# Patient Record
Sex: FEMALE | Race: BLACK OR AFRICAN AMERICAN | NOT HISPANIC OR LATINO | URBAN - METROPOLITAN AREA
[De-identification: names, ages, dates, MRNs, and addresses within clinical notes are randomized per-mention and may not be internally consistent; named-entity substitution may affect disease eponyms.]

---

## 2022-08-29 PROBLEM — R92.30 DENSE BREAST TISSUE: Status: ACTIVE | Noted: 2022-08-29

## 2022-08-29 PROBLEM — N92.6 LATE MENSES: Status: ACTIVE | Noted: 2022-08-29

## 2022-08-29 PROBLEM — N89.8 VAGINAL DISCHARGE: Status: ACTIVE | Noted: 2022-08-29

## 2023-03-16 PROBLEM — R30.0 DYSURIA: Status: ACTIVE | Noted: 2023-03-16

## 2023-03-16 PROBLEM — N91.2 AMENORRHEA: Status: ACTIVE | Noted: 2023-03-16

## 2023-03-16 PROBLEM — N89.8 VAGINAL DISCHARGE: Status: RESOLVED | Noted: 2022-08-29 | Resolved: 2023-03-16

## 2023-03-16 PROBLEM — R92.30 DENSE BREAST TISSUE: Status: RESOLVED | Noted: 2022-08-29 | Resolved: 2023-03-16

## 2023-03-23 PROBLEM — O09.32 INITIAL OBSTETRIC VISIT IN SECOND TRIMESTER: Status: ACTIVE | Noted: 2023-03-23

## 2023-03-23 PROBLEM — R30.0 DYSURIA: Status: RESOLVED | Noted: 2023-03-16 | Resolved: 2023-03-23

## 2023-03-23 PROBLEM — N91.2 AMENORRHEA: Status: RESOLVED | Noted: 2023-03-16 | Resolved: 2023-03-23

## 2023-06-13 ENCOUNTER — PATIENT MESSAGE (OUTPATIENT)
Dept: RESEARCH | Facility: HOSPITAL | Age: 23
End: 2023-06-13

## 2023-06-30 ENCOUNTER — PATIENT MESSAGE (OUTPATIENT)
Dept: FAMILY MEDICINE | Facility: CLINIC | Age: 23
End: 2023-06-30
Payer: MEDICAID

## 2023-07-11 ENCOUNTER — PATIENT MESSAGE (OUTPATIENT)
Dept: RESEARCH | Facility: HOSPITAL | Age: 23
End: 2023-07-11
Payer: MEDICAID

## 2023-09-05 ENCOUNTER — PATIENT MESSAGE (OUTPATIENT)
Dept: CARDIOLOGY | Facility: CLINIC | Age: 23
End: 2023-09-05
Payer: MEDICAID

## 2023-10-04 PROBLEM — Z09 POSTOP CHECK: Status: ACTIVE | Noted: 2023-10-04

## 2023-10-04 PROBLEM — N92.6 LATE MENSES: Status: RESOLVED | Noted: 2022-08-29 | Resolved: 2023-10-04

## 2023-10-04 PROBLEM — O09.32 INITIAL OBSTETRIC VISIT IN SECOND TRIMESTER: Status: RESOLVED | Noted: 2023-03-23 | Resolved: 2023-10-04

## 2023-10-05 ENCOUNTER — CLINICAL SUPPORT (OUTPATIENT)
Dept: REHABILITATION | Facility: HOSPITAL | Age: 23
End: 2023-10-05
Attending: STUDENT IN AN ORGANIZED HEALTH CARE EDUCATION/TRAINING PROGRAM
Payer: MEDICAID

## 2023-10-05 DIAGNOSIS — M62.89 PELVIC FLOOR DYSFUNCTION: ICD-10-CM

## 2023-10-05 DIAGNOSIS — M54.50 LUMBAR PAIN: ICD-10-CM

## 2023-10-05 DIAGNOSIS — R52 PAIN: ICD-10-CM

## 2023-10-05 PROCEDURE — 97110 THERAPEUTIC EXERCISES: CPT | Mod: PN

## 2023-10-05 PROCEDURE — 97161 PT EVAL LOW COMPLEX 20 MIN: CPT | Mod: PN

## 2023-10-05 NOTE — PLAN OF CARE
[facility]   Physical Therapy Initial Evaluation     Date: 10/5/2023   Name: Haresh Hood  Clinic Number: [id number]    Therapy Diagnosis:   Encounter Diagnoses   Name Primary?    Pain     Lumbar pain     Pelvic floor dysfunction      Physician: Shannan Zavala MD    Physician Orders: PT Eval and Treat   Medical Diagnosis from Referral: Pain [R52]  Evaluation Date: 10/5/2023  Authorization Period Expiration: 2023  Plan of Care Expiration: 2023  Progress Note Due: 2023  Visit # / Visits authorized:  eval   FOTO: 1/3 (last performed 10/5/2023)     Precautions: Standard, hx of     Time In: 3:30  Time Out: 4:00  Total Appointment Time (timed & untimed codes): 30 minutes      SUBJECTIVE     Date of onset: about 2 months ago     History of current condition - Haresh reports: pain to left flank region and tailbone towards end of pregnancy; greater with prolong sitting and standing. Reports pain to area with sit to stand transfers and bending over to dress her baby. History of  2 weeks ago.      Falls: none    Imaging: none    Prior Therapy:   [x] N/A    [] Yes:   Social History: lives with their spouse and new born   Occupation: na   Prior Level of Function: independent  Current Level of Function: independent with increased pain     Pain:  Current 6/10, worst 7/10  Location: left lumbar and tailbone  Description: Aching  Aggravating Factors: Bending, Getting out of bed/chair, and prolong sitting and standing   Easing Factors: rest    Patients goals: reduce pain and return to PLOF      Medical History:   Past Medical History:   Diagnosis Date    HPV in female        Surgical History:   Haresh Hood  has a past surgical history that includes Breast biopsy.    Medications:   Haresh has a current medication list which includes the following prescription(s): aspirin, ibuprofen, multivit 38/folate no.6/philip, and oxycodone.    Allergies:   Review of  patient's allergies indicates:  No Known Allergies     OBJECTIVE     POSTURE: increased lordosis     SFMA:  Top Tiers Right  (Spine) Left Notes    Multi-segmental   Flexion DP proximal knee     Multi-segmental Extension DN 50% limited     Multi-segmental Rotation  FN DN 50% limited    Single Leg Stance  (10 sec) FN FN    FN = Functional Normal   FP = Functional Painful  DN = Dysfunctional Normal   DP = Dysfuncional Painful     STRENGTH:   Lower Extremity  Strength RIGHT   LEFT   Hip Flexion  []1  []2  []3  [x]4*  []5      []+ []- []1  []2  []3  [x]4*  []5      [x]+ []-   Knee Flexion []1  []2  []3  [x]4  []5      []+ []- []1  []2  []3  [x]4  []5      []+ []-   Knee Extension []1  []2  []3  [x]4  []5      []+ []- []1  []2  []3  [x]4  []5      []+ []-   Ankle Dorsiflexion []1  []2  []3  [x]4  []5      []+ []- []1  []2  []3  [x]4  []5      []+ []-     RANGE OF MOTION: (*) pain and/or dysfunction   BLE within normal limits, except limited left hip internal rotation     SPECIAL TESTS:   SI Cluster test:     (-) Distraction     (-) Thigh thrust    (NT) Gaenslen's     (-) Compression     (NT) Sacral Thrust     SENSATION:  [x] Intact to Light Touch     [] Impaired:    PALPATION: no tenderness to palpation noted     JOINT MOBILITY: NT    Intake Outcome Measure for FOTO lumbar Survey    Therapist reviewed FOTO scores for Haresh Hood on 10/5/2023.   FOTO documents entered into [employer] - see Media section.    Intake Score: 65%       TREATMENT     Total Treatment time (time-based codes) separate from Evaluation: 10 minutes      Haresh received the treatments listed below:      manual therapy techniques: were applied to - for (-) minutes , including:     therapeutic exercises to develop strength, endurance, ROM, and flexibility for (-) minutesincluding:    neuromuscular re-education activities to improve: Balance, Coordination, Proprioception, Posture, and stability for (10) minutes . The following activities were  included:   Supine transverse abdominis training    Supine posterior pelvic tilt   Glut set with TA and PPT    therapeutic activities to improve functional performance for (-) minutes including:    gait training to improve functional mobility and safety for (-) minutes, including:    PATIENT EDUCATION AND HOME EXERCISES     Education provided:   - HEP provided   - PT role in POC     Written Home Exercises Provided: yes. Exercises were reviewed and Haresh was able to demonstrate them prior to the end of the session.  Haresh demonstrated good  understanding of the education provided. See EMR under Patient Instructions for exercises provided during therapy sessions.    ASSESSMENT     Haresh is a 23 y.o. female referred to outpatient Physical Therapy with a medical diagnosis of left lumbar pain. Pt presents with impairments in the following categories: IMPAIRMENTS: ROM, strength, muscle length, posture, core strength and stability, functional movement patterns, and pelvic floor dysfunction.     Patient prognosis is Good.   Patient will benefit from skilled outpatient Physical Therapy to address the deficits stated above and in the chart below, provide patient /family education, and to maximize patientt's level of independence.     Plan of care discussed with patient: Yes  Patient's spiritual, cultural and educational needs considered and patient is agreeable to the plan of care and goals as stated below:     Anticipated Barriers for therapy: none    Medical Necessity is demonstrated by the following  History  Co-morbidities and personal factors that may impact the plan of care [x] LOW: no personal factors / co-morbidities  [] MODERATE: 1-2 personal factors / co-morbidities  [] HIGH: 3+ personal factors / co-morbidities    Moderate / High Support Documentation:   Past Medical History:   Diagnosis Date    HPV in female         Examination  Body Structures and Functions, activity limitations and participation restrictions  that may impact the plan of care [x] LOW: addressing 1-2 elements  [] MODERATE: 3+ elements  [] HIGH: 4+ elements (please support below)    Moderate / High Support Documentation: na     Clinical Presentation [x] LOW: stable  [] MODERATE: Evolving  [] HIGH: Unstable     Decision Making/ Complexity Score: low       Goals:   Short Term Goals:  3 weeks  HEP: Patient will demonstrate 50% independence with HEP   Pain: Pt will demonstrate improved pain less than or equal to 3/10 at worse  Function: Patient will increase functional score to equal or greater than to 70 out of 100 on FOTO.  Mobility: Patient will improve trunk rotation by 15%.   Strength: Patient will improve impaired strength to greater or equal to 4/5.     Long Term Goals:  4 weeks  HEP: Pt will be independent with advanced HEP.  Pain: Pt will demonstrate improved pain less than or equal to 1/10 at worse in order to progress toward maximal functional ability and improve QOL.  Function: Patient will  increase functional score to equal or greater than to 75 out of 100 on FOTO to improve functional independence and quality of life.   Mobility: Patient will demonstrate no pain with trunk ROM to return to prior level of function.   Strength: Patient will improve impaired strength to greater or equal to 4+/5 in order to return to PLOF   Pt will be able to bend forward with no increase of pain in order to care for baby.     PLAN   Plan of care Certification: 10/5/2023 to 11/17/2023.    Outpatient Physical Therapy 2 times weekly for 6 weeks to include the following interventions: Cervical/Lumbar Traction, Electrical Stimulation  , Gait Training, Manual Therapy, Moist Heat/ Ice, Neuromuscular Re-ed, Patient Education, Self Care, Therapeutic Activities, Therapeutic Exercise, and FDN .     Dorene Briggs, PT      I CERTIFY THE NEED FOR THESE SERVICES FURNISHED UNDER THIS PLAN OF TREATMENT AND WHILE UNDER MY CARE   Physician's comments:     Physician's Signature:  ___________________________________________________

## 2023-10-09 PROBLEM — M62.89 PELVIC FLOOR DYSFUNCTION: Status: ACTIVE | Noted: 2023-10-09

## 2023-10-09 PROBLEM — M54.50 LUMBAR PAIN: Status: ACTIVE | Noted: 2023-10-09

## 2023-10-12 NOTE — PROGRESS NOTES
[facility]   Pelvic Health Physical Therapy Re-Evaluation     Date: 10/13/2023   Name: Haresh Hood  Clinic Number: [id number]    Therapy Diagnosis:   Encounter Diagnoses   Name Primary?    Lumbar pain Yes    Pelvic floor dysfunction      Physician: Shannan Zavala MD    Physician Orders: PT Eval and Treat   Medical Diagnosis from Referral: Pain [R52]  Evaluation Date: 10/13/2023  Authorization Period Expiration: 2024  Plan of Care Expiration: 2023  Progress Note Due: 2023  Visit # / Visits authorized:  + eval  FOTO: Issued Visit #: 1 /3 (pelvic floor and lumbar)    Precautions: Standard, 3 weeks postpartum     Time In: 8:16  Time Out: 9:03  Total Appointment Time (timed & untimed codes): 47 minutes    SUBJECTIVE     Date of onset: during third trimester of pregnancy     History of current condition - Haresh reports: pain in low back and glute throughout 3rd trimester as well as beginning of post partum. She describes it as a deep pain on the left side.  delivery  and she is currently 3 weeks postpartum. She states that her pain has gotten worse since delivery. She has pain with transfers such as sit to sidelying and sit to/from stand after prolonged sitting or standing.     OB/GYN History: , caesarean, and painful periods  Sexually active? Yes  Pain with vaginal exams, intercourse or tampon use? Yes. Patient reports pain during start of intercourse and increased tightness/pelvic pain before orgasm.     Bladder/Bowel History: trouble initiating urine stream, trouble feeling bladder urge/fullness, trouble emptying bladder completely, and constipation/straining for movement  Frequency of urination:   Daytime: 6          Nighttime: 2  Difficulty initiating urine stream: Yes  Urine stream: strong  Complete emptying: No  Bladder leakage: No  Frequency of incidents: none  Amount leaked (urine):  none  Urinary Urgency: No, Able to delay the  urge for at least 5 minute(s). Only getting urge when completely full   Frequency of bowel movements:  once a week if chance. Constipation as a kid. Constipation again since giving birth  Difficulty initiating BM: Yes  Quality/Shape of BM: [place] Stool Chart Type 2  Does Patient Feel Empty after BM? No  Fiber Supplements or Laxative Use? Yes, stool softeners kind of helping   Colon leakage: No  Frequency of incidents: none   Amount leaked (bowels):  none  Form of protection: pad, postpartum  Number of pads required in 24 hours: 3    Pain:  Location: low back/glute  Current 0/10, worst 10/10  Description: Sharp will last about 10 minutes  Aggravating Factors/Activities that cause symptoms: Prolonged standing, Prolonged sitting, Bending, Squatting, and Lifting sitting in the bath tub or getting into bed  Easing Factors: nothing     Imaging: none    Prior Therapy: no  Social History:  lives with their family  Current exercise: walking 4 times a week  Occupation: none  Prior Level of Function: WFL  Current Level of Function: WFL; limited to certain transfers due to pain     Types of fluid intake: water and coffee 3 bottles of water, coffee 4x/week   Diet: regular   Abuse/Neglect: No     Patients goals: decrease low back pain and improve healthy lifestyle     Medical History: 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     Surgical History: Haresh Hood  has a past surgical history that includes Breast biopsy.    Medications: Haresh has a current medication list which includes the following prescription(s): aspirin, ibuprofen, multivit 38/folate no.6/philip, and oxycodone.    Allergies:   Review of patient's allergies indicates:  No Known Allergies     OBJECTIVE     Deferring internal assessment until 6 weeks post partum.     ORTHO SCREEN  Posture in sitting: slouched   Posture in standing: increased lordosis  Pelvic alignment: no sign of deviations noted in supine   SI Joint Palpation: Tenderness below  left SI joint palpation.  Single limb balance: RLE- L pelvic drop    STRENGTH:   Lower Extremity  Strength RIGHT    LEFT   Hip Flexion  []1  []2  []3  [x]4*  []5      []+ []- []1  []2  []3  [x]4*  []5      [x]+ []-   Knee Flexion []1  []2  []3  [x]4  []5      []+ []- []1  []2  []3  [x]4  []5      []+ []-   Knee Extension []1  []2  []3  [x]4  []5      []+ []- []1  []2  []3  [x]4  []5      []+ []-   Ankle Dorsiflexion []1  []2  []3  [x]4  []5      []+ []- []1  []2  []3  [x]4  []5      []+ []-        ABDOMINAL WALL ASSESSMENT  Palpation: boggy and hypotonic   Abdominal strength: Transverse abdominus: fair  Scarring: (assess  scar next visit)  Pelvic Floor Muscle and Transverse Abdominus Synergy: absent  Diastasis: present    Curl-up test for Inter-rectus distance (IRD)    With transverse abdominus contraction: greater than 2 fingers width at the umbilicus, greater than 2 fingers width 2 inches above umbilicus, 2 fingers width 2 inches below umbilicus   Increased depth with curl up noted.         BREATHING MECHANICS ASSESSMENT   Thorax Assessment During Quiet Respiration: Decreased excursion of abdominal wall  and Decreased excursion bilaterally of lateral ribs   Thorax Assessment During Deep Respiration: Decreased excursion of abdominal wall     Limitation/Restriction for FOTO Pelvic Floor Survey    Therapist reviewed FOTO scores for Haresh Hood on 10/13/2023.   FOTO documents entered into [employer] - see Media section.    Limitation Score:          TREATMENT     Total Treatment time (time-based codes) separate from Evaluation: 18 minutes       Therapeutic Activity to improve dynamic functional performance for 08 minutes including:    Reviewed HEP: TA contractions, Pelvic tilts, Diaphragmatic breathing  Education and demonstration on double voiding techniques. Handout given.   Education on TA contractions and diaphragmatic breathing       PATIENT EDUCATION AND HOME EXERCISES     Education provided:   general  anatomy/physiology of urinary/ bowel  system, benefits of treatment, risks of treatment, and alternative methods of treatment were discussed with the patient. Additionally, bladder irritants, anatomy/physiology of pelvic floor, posture/body mechanices, bladder retraining, diaphragmatic breathing, double voiding techniques, isometric abdominal exercises, kegels, proper bearing down techniques, and fluid intake/dietary modifications were reviewed.     Written Home Exercises provided: yes.  Exercises were reviewed and Haresh was able to demonstrate them prior to the end of the session. Haresh demonstrated good  understanding of the education provided. See EMR under Patient Instructions for exercises provided during therapy sessions.    ASSESSMENT     Haresh is a 23 y.o. female referred to outpatient Physical Therapy with a medical diagnosis of Pain. Pt presents with altered posture, poor knowledge of body mechanics and posture, adhered abdominal scar, poor trunk stability, pelvic floor tenderness, decreased pelvic muscle strength, decreased endurance of the pelvic muscles, increased tension of the pelvic muscles, poor fluid intake, and dysfunctional voiding.        Patient prognosis is Good.   Patient will benefit from skilled outpatient Physical Therapy to address the deficits stated above and in the chart below, provide patient/family education, and to maximize patient's level of independence.     Plan of care discussed with patient: Yes  Patient's spiritual, cultural and educational needs considered and patient is agreeable to the plan of care and goals as stated below:     Anticipated Barriers for therapy: none  Goals:   Short Term Goals:  3 weeks  HEP: Patient will demonstrate 50% independence with HEP   Pain: Pt will demonstrate improved pain less than or equal to 3/10 at worse  Function: Patient will increase functional score to equal or greater than to 70 out of 100 on FOTO.  Mobility: Patient will improve  trunk rotation by 15%.   Strength: Patient will improve impaired strength to greater or equal to 4/5.   Pelvic Floor: Patient will demonstrate excellent knowledge and adherence to HEP to facilitate optimal recovery.  Pelvic Floor: Patient will demonstrate proper PFM contraction, relaxation, and lengthening coordinated with TA and breath for improved muscle coordination needed for functional activity.     Long Term Goals:  4 weeks  HEP: Pt will be independent with advanced HEP.  Pain: Pt will demonstrate improved pain less than or equal to 1/10 at worse in order to progress toward maximal functional ability and improve QOL.  Function: Patient will  increase functional score to equal or greater than to 75 out of 100 on FOTO to improve functional independence and quality of life.   Mobility: Patient will demonstrate no pain with trunk ROM to return to prior level of function.   Strength: Patient will improve impaired strength to greater or equal to 4+/5 in order to return to PLOF   Pt will be able to bend forward with no increase of pain in order to care for baby.   Pelvic Floor: Patient to report a decrease in pain to no more than 2/10 at it's worst with intercourse.  Pelvic Floor: Patient to report improved restorative sleeping, waking up no more than 1x/night due to urge to urinate.  Pelvic Floor: Patient to demonstrate proper positioning on commode with breathing techniques to decrease strain with BM to enable pt to feel empty after BM.  Pelvic Floor: Patient to demonstrate independence with performing bowel massage to help with gut motility.     PLAN   Plan of care Certification: 10/5/2023 to 11/17/2023.     Outpatient Physical Therapy 2 times weekly for 6 weeks to include the following interventions: Cervical/Lumbar Traction, Electrical Stimulation  , Gait Training, Manual Therapy, Moist Heat/ Ice, Neuromuscular Re-ed, Patient Education, Self Care, Therapeutic Activities, Therapeutic Exercise, and FDN .      Steve Tinsley PT      I CERTIFY THE NEED FOR THESE SERVICES FURNISHED UNDER THIS PLAN OF TREATMENT AND WHILE UNDER MY CARE   Physician's comments:     Physician's Signature: ___________________________________________________

## 2023-10-13 ENCOUNTER — CLINICAL SUPPORT (OUTPATIENT)
Dept: REHABILITATION | Facility: HOSPITAL | Age: 23
End: 2023-10-13
Attending: STUDENT IN AN ORGANIZED HEALTH CARE EDUCATION/TRAINING PROGRAM
Payer: MEDICAID

## 2023-10-13 DIAGNOSIS — M54.50 LUMBAR PAIN: Primary | ICD-10-CM

## 2023-10-13 DIAGNOSIS — M62.89 PELVIC FLOOR DYSFUNCTION: ICD-10-CM

## 2023-10-13 PROCEDURE — 97164 PT RE-EVAL EST PLAN CARE: CPT | Mod: PN

## 2023-10-13 PROCEDURE — 97530 THERAPEUTIC ACTIVITIES: CPT | Mod: PN

## 2023-10-16 ENCOUNTER — CLINICAL SUPPORT (OUTPATIENT)
Dept: REHABILITATION | Facility: HOSPITAL | Age: 23
End: 2023-10-16
Payer: MEDICAID

## 2023-10-16 DIAGNOSIS — M62.89 PELVIC FLOOR DYSFUNCTION: ICD-10-CM

## 2023-10-16 DIAGNOSIS — M54.50 LUMBAR PAIN: Primary | ICD-10-CM

## 2023-10-16 PROCEDURE — 97110 THERAPEUTIC EXERCISES: CPT | Mod: PN

## 2023-10-16 NOTE — PROGRESS NOTES
Pelvic Health Physical Therapy   Treatment Note     Name: Haresh Hood  [facility] Number: [id number]    Therapy Diagnosis:   Encounter Diagnoses   Name Primary?    Lumbar pain Yes    Pelvic floor dysfunction      Physician: Shannan Zavala MD    Visit Date: 10/16/2023    Physician Orders: PT Eval and Treat   Medical Diagnosis from Referral: Pain [R52]  Evaluation Date: 10/13/2023  Authorization Period Expiration: 03/28/2024  Plan of Care Expiration: 11/24/2023  Progress Note Due: 11/13/2023  Visit # / Visits authorized: 2/ 20 + eval  FOTO: Issued Visit #: 1 /3 (pelvic floor and lumbar)     Precautions: Standard, 3 weeks postpartum      Time In: 1:30  Time Out: 2:24  Total Appointment Time (timed & untimed codes): 54 minutes    Subjective     Pt reports: increased low back pain especially at night. Rates her pain a 9/10 at night.   She was compliant with home exercise program.  Response to previous treatment: increased pain and stiffness  Functional change: no change     Pain: 0/10  Location: left low back     Objective     Objective Measures updated at progress report unless specified.     Treatment     Haresh received therapeutic exercises to develop  strength, endurance, ROM, flexibility, posture, and core stabilization for 6 minutes including:     Bike 6 minutes     Haresh received the following manual therapy techniques: to develop flexibility and extensibility for 10 minutes including:     Scar mobilizations techniques- circles around suprapubic scar       Haresh participated in neuromuscular re-education activities to develop Coordination, Control, Down training, Proprioception, and Sense for 28 minutes including:     Diaphragmatic breathing   TA contractions with diaphragmatic breathing x10  Posterior pelvic tilts with deep breathing technique x15  Bridge with ball squeeze, TA contraction, and deep breathing x20  Cat cow x20  Magda pose 2 minutes     Haresh participated in dynamic functional therapeutic  activities to improve functional performance for 10  minutes, including:    Proper form and posture when picking up baby from lower surface   Colon massage educated on and demonstrated technique       Home Exercises Provided and Patient Education Provided     Education provided:   - bladder irritants, anatomy/physiology of pelvic floor, posture/body mechanices, bladder retraining, diaphragmatic breathing, double voiding techniques, isometric abdominal exercises, proper bearing down techniques, and fluid intake/dietary modifications  Discussed progression of plan of care with patient; educated pt in activity modification; reviewed HEP with pt. Pt demonstrated and verbalized understanding of all instruction and was provided with a handout of HEP (see Patient Instructions).      Written Home Exercises Provided: yes.  Exercises were reviewed and Haresh was able to demonstrate them prior to the end of the session.  Haresh demonstrated good  understanding of the education provided.     See EMR under Patient Instructions for exercises provided prior visit.    Assessment     Haresh tolerated therapy session well with no complaints of pain throughout session. Focused on transverse abdominus contractions as well as glute/hip strengthening interventions. Introduced proper breathing techniques with exercise as well. Educated patient on proper posture and form when picking up baby and objects from a lower surface. Also educated patient on keeping baby close to center of mass. Assessed  scar today. Decreased mobility noted however scar is still healing. Performed lite scar mobilization around scar. Continue progressing in POC as tolerated.      Haresh Is not progressing well towards her goals.   Pt prognosis is Good.     Pt will continue to benefit from skilled outpatient physical therapy to address the deficits listed in the problem list box on initial evaluation, provide pt/family education and to maximize pt's level of  independence in the home and community environment.     Pt's spiritual, cultural and educational needs considered and pt agreeable to plan of care and goals.     Anticipated barriers to physical therapy:  none    Goals:   Short Term Goals:  3 weeks  HEP: Patient will demonstrate 50% independence with HEP. Progressing  Pain: Pt will demonstrate improved pain less than or equal to 3/10 at worse. Progressing  Function: Patient will increase functional score to equal or greater than to 70 out of 100 on FOTO. Progressing  Mobility: Patient will improve trunk rotation by 15%. Progressing  Strength: Patient will improve impaired strength to greater or equal to 4/5. Progressing  Pelvic Floor: Patient will demonstrate excellent knowledge and adherence to HEP to facilitate optimal recovery. Progressing  Pelvic Floor: Patient will demonstrate proper PFM contraction, relaxation, and lengthening coordinated with TA and breath for improved muscle coordination needed for functional activity. Progressing     Long Term Goals:  4 weeks  HEP: Pt will be independent with advanced HEP. Progressing  Pain: Pt will demonstrate improved pain less than or equal to 1/10 at worse in order to progress toward maximal functional ability and improve QOL. Progressing  Function: Patient will  increase functional score to equal or greater than to 75 out of 100 on FOTO to improve functional independence and quality of life.  Progressing  Mobility: Patient will demonstrate no pain with trunk ROM to return to prior level of function.   Strength: Patient will improve impaired strength to greater or equal to 4+/5 in order to return to PLOF. Progressing  Pt will be able to bend forward with no increase of pain in order to care for baby. Progressing  Pelvic Floor: Patient to report a decrease in pain to no more than 2/10 at it's worst with intercourse. Progressing  Pelvic Floor: Patient to report improved restorative sleeping, waking up no more than  1x/night due to urge to urinate. Progressing  Pelvic Floor: Patient to demonstrate proper positioning on commode with breathing techniques to decrease strain with BM to enable pt to feel empty after BM. Progressing  Pelvic Floor: Patient to demonstrate independence with performing bowel massage to help with gut motility. Progressing     PLAN   Plan of care Certification: 10/5/2023 to 11/17/2023.     Outpatient Physical Therapy 2 times weekly for 6 weeks to include the following interventions: Cervical/Lumbar Traction, Electrical Stimulation  , Gait Training, Manual Therapy, Moist Heat/ Ice, Neuromuscular Re-ed, Patient Education, Self Care, Therapeutic Activities, Therapeutic Exercise, and FDN .        Steve Tinsley, PT

## 2023-10-16 NOTE — PLAN OF CARE
[facility]   Pelvic Health Physical Therapy Re-Evaluation     Date: 10/13/2023   Name: Haresh Hood  Clinic Number: [id number]    Therapy Diagnosis:   Encounter Diagnoses   Name Primary?    Lumbar pain Yes    Pelvic floor dysfunction      Physician: Shannan Zavala MD    Physician Orders: PT Eval and Treat   Medical Diagnosis from Referral: Pain [R52]  Evaluation Date: 10/13/2023  Authorization Period Expiration: 2024  Plan of Care Expiration: 2023  Progress Note Due: 2023  Visit # / Visits authorized:  + eval  FOTO: Issued Visit #: 1 /3 (pelvic floor and lumbar)    Precautions: Standard, 3 weeks postpartum     Time In: 8:16  Time Out: 9:03  Total Appointment Time (timed & untimed codes): 47 minutes    SUBJECTIVE     Date of onset: during third trimester of pregnancy     History of current condition - Haresh reports: pain in low back and glute throughout 3rd trimester as well as beginning of post partum. She describes it as a deep pain on the left side.  delivery  and she is currently 3 weeks postpartum. She states that her pain has gotten worse since delivery. She has pain with transfers such as sit to sidelying and sit to/from stand after prolonged sitting or standing.     OB/GYN History: , caesarean, and painful periods  Sexually active? Yes  Pain with vaginal exams, intercourse or tampon use? Yes. Patient reports pain during start of intercourse and increased tightness/pelvic pain before orgasm.     Bladder/Bowel History: trouble initiating urine stream, trouble feeling bladder urge/fullness, trouble emptying bladder completely, and constipation/straining for movement  Frequency of urination:   Daytime: 6          Nighttime: 2  Difficulty initiating urine stream: Yes  Urine stream: strong  Complete emptying: No  Bladder leakage: No  Frequency of incidents: none  Amount leaked (urine): none  Urinary Urgency: No, Able to delay the  urge for at least 5 minute(s). Only getting urge when completely full   Frequency of bowel movements: once a week if chance. Constipation as a kid. Constipation again since giving birth  Difficulty initiating BM: Yes  Quality/Shape of BM: [place] Stool Chart Type 2  Does Patient Feel Empty after BM? No  Fiber Supplements or Laxative Use? Yes, stool softeners kind of helping   Colon leakage: No  Frequency of incidents: none   Amount leaked (bowels): none  Form of protection: pad, postpartum  Number of pads required in 24 hours: 3    Pain:  Location: low back/glute  Current 0/10, worst 10/10  Description: Sharp will last about 10 minutes  Aggravating Factors/Activities that cause symptoms: Prolonged standing, Prolonged sitting, Bending, Squatting, and Lifting sitting in the bath tub or getting into bed  Easing Factors: nothing     Imaging: none    Prior Therapy: no  Social History:  lives with their family  Current exercise: walking 4 times a week  Occupation: none  Prior Level of Function: WFL  Current Level of Function: WFL; limited to certain transfers due to pain     Types of fluid intake: water and coffee 3 bottles of water, coffee 4x/week   Diet: regular   Abuse/Neglect: No     Patients goals: decrease low back pain and improve healthy lifestyle     Medical History: 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     Surgical History: Haresh Hood  has a past surgical history that includes Breast biopsy.    Medications: Haresh has a current medication list which includes the following prescription(s): aspirin, ibuprofen, multivit 38/folate no.6/philip, and oxycodone.    Allergies:   Review of patient's allergies indicates:  No Known Allergies     OBJECTIVE     Deferring internal assessment until 6 weeks post partum.     ORTHO SCREEN  Posture in sitting: slouched   Posture in standing: increased lordosis  Pelvic alignment: no sign of deviations noted in supine   SI Joint Palpation: Tenderness below left  SI joint palpation.  Single limb balance: RLE- L pelvic drop    STRENGTH:   Lower Extremity  Strength RIGHT    LEFT   Hip Flexion  []1  []2  []3  [x]4*  []5      []+ []- []1  []2  []3  [x]4*  []5      [x]+ []-   Knee Flexion []1  []2  []3  [x]4  []5      []+ []- []1  []2  []3  [x]4  []5      []+ []-   Knee Extension []1  []2  []3  [x]4  []5      []+ []- []1  []2  []3  [x]4  []5      []+ []-   Ankle Dorsiflexion []1  []2  []3  [x]4  []5      []+ []- []1  []2  []3  [x]4  []5      []+ []-        ABDOMINAL WALL ASSESSMENT  Palpation: boggy and hypotonic   Abdominal strength: Transverse abdominus: fair  Scarring: (assess  scar next visit)  Pelvic Floor Muscle and Transverse Abdominus Synergy: absent  Diastasis: present    Curl-up test for Inter-rectus distance (IRD)    With transverse abdominus contraction: greater than 2 fingers width at the umbilicus, greater than 2 fingers width 2 inches above umbilicus, 2 fingers width 2 inches below umbilicus   Increased depth with curl up noted.         BREATHING MECHANICS ASSESSMENT   Thorax Assessment During Quiet Respiration: Decreased excursion of abdominal wall  and Decreased excursion bilaterally of lateral ribs   Thorax Assessment During Deep Respiration: Decreased excursion of abdominal wall     Limitation/Restriction for FOTO Pelvic Floor Survey    Therapist reviewed FOTO scores for Haresh Hood on 10/13/2023.   FOTO documents entered into [employer] - see Media section.    Limitation Score:          TREATMENT     Total Treatment time (time-based codes) separate from Evaluation: 18 minutes       Therapeutic Activity to improve dynamic functional performance for 18 minutes including:    Review over HEP: TA contractions, Pelvic tilts, Diaphragmatic breathing  Education and demonstration on double voiding techniques. Handout given.   Education on TA contractions and diaphragmatic breathing       PATIENT EDUCATION AND HOME EXERCISES     Education provided:   general  anatomy/physiology of urinary/ bowel  system, benefits of treatment, risks of treatment, and alternative methods of treatment were discussed with the patient. Additionally, bladder irritants, anatomy/physiology of pelvic floor, posture/body mechanices, bladder retraining, diaphragmatic breathing, double voiding techniques, isometric abdominal exercises, kegels, proper bearing down techniques, and fluid intake/dietary modifications were reviewed.     Written Home Exercises provided: yes.  Exercises were reviewed and Haresh was able to demonstrate them prior to the end of the session. Haresh demonstrated good  understanding of the education provided. See EMR under Patient Instructions for exercises provided during therapy sessions.    ASSESSMENT     Haresh is a 23 y.o. female referred to outpatient Physical Therapy with a medical diagnosis of Pain. Pt presents with altered posture, poor knowledge of body mechanics and posture, adhered abdominal scar, poor trunk stability, pelvic floor tenderness, decreased pelvic muscle strength, decreased endurance of the pelvic muscles, increased tension of the pelvic muscles, poor fluid intake, and dysfunctional voiding.        Patient prognosis is Good.   Patient will benefit from skilled outpatient Physical Therapy to address the deficits stated above and in the chart below, provide patient/family education, and to maximize patient's level of independence.     Plan of care discussed with patient: Yes  Patient's spiritual, cultural and educational needs considered and patient is agreeable to the plan of care and goals as stated below:     Anticipated Barriers for therapy: none  Goals:   Short Term Goals:  3 weeks  HEP: Patient will demonstrate 50% independence with HEP   Pain: Pt will demonstrate improved pain less than or equal to 3/10 at worse  Function: Patient will increase functional score to equal or greater than to 70 out of 100 on FOTO.  Mobility: Patient will improve  trunk rotation by 15%.   Strength: Patient will improve impaired strength to greater or equal to 4/5.   Pelvic Floor: Patient will demonstrate excellent knowledge and adherence to HEP to facilitate optimal recovery.  Pelvic Floor: Patient will demonstrate proper PFM contraction, relaxation, and lengthening coordinated with TA and breath for improved muscle coordination needed for functional activity.     Long Term Goals:  4 weeks  HEP: Pt will be independent with advanced HEP.  Pain: Pt will demonstrate improved pain less than or equal to 1/10 at worse in order to progress toward maximal functional ability and improve QOL.  Function: Patient will  increase functional score to equal or greater than to 75 out of 100 on FOTO to improve functional independence and quality of life.   Mobility: Patient will demonstrate no pain with trunk ROM to return to prior level of function.   Strength: Patient will improve impaired strength to greater or equal to 4+/5 in order to return to PLOF   Pt will be able to bend forward with no increase of pain in order to care for baby.   Pelvic Floor: Patient to report a decrease in pain to no more than 2/10 at it's worst with intercourse.  Pelvic Floor: Patient to report improved restorative sleeping, waking up no more than 1x/night due to urge to urinate.  Pelvic Floor: Patient to demonstrate proper positioning on commode with breathing techniques to decrease strain with BM to enable pt to feel empty after BM.  Pelvic Floor: Patient to demonstrate independence with performing bowel massage to help with gut motility.     PLAN   Plan of care Certification: 10/5/2023 to 11/17/2023.     Outpatient Physical Therapy 2 times weekly for 6 weeks to include the following interventions: Cervical/Lumbar Traction, Electrical Stimulation  , Gait Training, Manual Therapy, Moist Heat/ Ice, Neuromuscular Re-ed, Patient Education, Self Care, Therapeutic Activities, Therapeutic Exercise, and FDN .      Steve Tinsley PT      I CERTIFY THE NEED FOR THESE SERVICES FURNISHED UNDER THIS PLAN OF TREATMENT AND WHILE UNDER MY CARE   Physician's comments:     Physician's Signature: ___________________________________________________

## 2023-10-18 ENCOUNTER — CLINICAL SUPPORT (OUTPATIENT)
Dept: REHABILITATION | Facility: HOSPITAL | Age: 23
End: 2023-10-18
Payer: MEDICAID

## 2023-10-18 DIAGNOSIS — M54.50 LUMBAR PAIN: Primary | ICD-10-CM

## 2023-10-18 DIAGNOSIS — M62.89 PELVIC FLOOR DYSFUNCTION: ICD-10-CM

## 2023-10-18 PROCEDURE — 97110 THERAPEUTIC EXERCISES: CPT | Mod: PN

## 2023-10-18 NOTE — PROGRESS NOTES
Pelvic Health Physical Therapy   Treatment Note     Name: Haresh Hood  Clinic Number: [id number]    Therapy Diagnosis:   Encounter Diagnoses   Name Primary?    Lumbar pain Yes    Pelvic floor dysfunction        Physician: Shannan Zavala MD    Visit Date: 10/18/2023    Physician Orders: PT Eval and Treat   Medical Diagnosis from Referral: Pain [R52]  Evaluation Date: 10/13/2023  Authorization Period Expiration: 03/28/2024  Plan of Care Expiration: 11/24/2023  Progress Note Due: 11/13/2023  Visit # / Visits authorized: 3/ 20 + eval  FOTO: Issued Visit #: 1 /3 (pelvic floor and lumbar)     Precautions: Standard, 4 weeks postpartum      Time In: 1:32  Time Out: 2:20  Total Appointment Time (timed & untimed codes): 48 minutes    Subjective     Pt reports: continued low back during the night and this morning when getting in and out of the bed.   She was compliant with home exercise program.  Response to previous treatment: increased pain and stiffness  Functional change: no change     Pain: 0/10   Location: left low back     Objective     Objective Measures updated at progress report unless specified.     Treatment     Haresh received therapeutic exercises to develop  strength, endurance, ROM, flexibility, posture, and core stabilization for 00 minutes including:     Bike 6 minutes     Haresh received the following manual therapy techniques: to develop flexibility and extensibility for 00 minutes including:     Scar mobilizations techniques- circles around suprapubic scar       Haersh participated in neuromuscular re-education activities to develop Coordination, Control, Down training, Proprioception, and Sense for 30 minutes including:     Diaphragmatic breathing   TA contractions with diaphragmatic breathing x15  Openbooks with bolster 2x10   LTRs 2 minutes   Posterior pelvic tilts with deep breathing technique x20  Bridge with ball squeeze, TA contraction, and deep breathing x15  Bridge at wall with leading LLE    Multifidus contraction x10 ea.  Bird dog LE only x10 ea.  Paloff press   Cat cow x20  Magda pose 2 minutes     Haresh participated in dynamic functional therapeutic activities to improve functional performance for 08  minutes, including:    Log rolling technique- patient able to demonstrate   Proper form and posture when picking up baby from lower surface   Colon massage educated on and demonstrated technique     Hot pack to left hip for 10 minutes.     Home Exercises Provided and Patient Education Provided     Education provided:   - bladder irritants, anatomy/physiology of pelvic floor, posture/body mechanices, bladder retraining, diaphragmatic breathing, double voiding techniques, isometric abdominal exercises, proper bearing down techniques, and fluid intake/dietary modifications  Discussed progression of plan of care with patient; educated pt in activity modification; reviewed HEP with pt. Pt demonstrated and verbalized understanding of all instruction and was provided with a handout of HEP (see Patient Instructions).      Written Home Exercises Provided: yes.  Exercises were reviewed and Haresh was able to demonstrate them prior to the end of the session.  Haresh demonstrated good  understanding of the education provided.     See EMR under Patient Instructions for exercises provided prior visit.    Assessment     Haresh presents to therapy today with continued reports of left low back pain when transferring in and out of the bed. Introduced log rolling technique when getting in and out of the bed to decrease strain on her SI joints and low back. Educated patient on importance of pelvic stability. Introduced multifidus activation intervention to improve lumbar strength and stabilization. Will continue focusing on core, lumbar, and lower extremity strengthening. Continue progressing in POC as tolerated.     Haresh Is not progressing well towards her goals.   Pt prognosis is Good.     Pt will continue to  benefit from skilled outpatient physical therapy to address the deficits listed in the problem list box on initial evaluation, provide pt/family education and to maximize pt's level of independence in the home and community environment.     Pt's spiritual, cultural and educational needs considered and pt agreeable to plan of care and goals.     Anticipated barriers to physical therapy:  none    Goals:   Short Term Goals:  3 weeks  HEP: Patient will demonstrate 50% independence with HEP. Progressing  Pain: Pt will demonstrate improved pain less than or equal to 3/10 at worse. Progressing  Function: Patient will increase functional score to equal or greater than to 70 out of 100 on FOTO. Progressing  Mobility: Patient will improve trunk rotation by 15%. Progressing  Strength: Patient will improve impaired strength to greater or equal to 4/5. Progressing  Pelvic Floor: Patient will demonstrate excellent knowledge and adherence to HEP to facilitate optimal recovery. Progressing  Pelvic Floor: Patient will demonstrate proper PFM contraction, relaxation, and lengthening coordinated with TA and breath for improved muscle coordination needed for functional activity. Progressing     Long Term Goals:  4 weeks  HEP: Pt will be independent with advanced HEP. Progressing  Pain: Pt will demonstrate improved pain less than or equal to 1/10 at worse in order to progress toward maximal functional ability and improve QOL. Progressing  Function: Patient will  increase functional score to equal or greater than to 75 out of 100 on FOTO to improve functional independence and quality of life.  Progressing  Mobility: Patient will demonstrate no pain with trunk ROM to return to prior level of function.   Strength: Patient will improve impaired strength to greater or equal to 4+/5 in order to return to PLOF. Progressing  Pt will be able to bend forward with no increase of pain in order to care for baby. Progressing  Pelvic Floor: Patient to  report a decrease in pain to no more than 2/10 at it's worst with intercourse. Progressing  Pelvic Floor: Patient to report improved restorative sleeping, waking up no more than 1x/night due to urge to urinate. Progressing  Pelvic Floor: Patient to demonstrate proper positioning on commode with breathing techniques to decrease strain with BM to enable pt to feel empty after BM. Progressing  Pelvic Floor: Patient to demonstrate independence with performing bowel massage to help with gut motility. Progressing     PLAN   Plan of care Certification: 10/5/2023 to 11/17/2023.     Outpatient Physical Therapy 2 times weekly for 6 weeks to include the following interventions: Cervical/Lumbar Traction, Electrical Stimulation  , Gait Training, Manual Therapy, Moist Heat/ Ice, Neuromuscular Re-ed, Patient Education, Self Care, Therapeutic Activities, Therapeutic Exercise, and FDN .        Steve Tinsley, PT

## 2023-10-23 ENCOUNTER — CLINICAL SUPPORT (OUTPATIENT)
Dept: REHABILITATION | Facility: HOSPITAL | Age: 23
End: 2023-10-23
Payer: MEDICAID

## 2023-10-23 DIAGNOSIS — M54.50 LUMBAR PAIN: Primary | ICD-10-CM

## 2023-10-23 DIAGNOSIS — M62.89 PELVIC FLOOR DYSFUNCTION: ICD-10-CM

## 2023-10-23 PROCEDURE — 97110 THERAPEUTIC EXERCISES: CPT | Mod: PN

## 2023-10-23 NOTE — PROGRESS NOTES
Pelvic Health Physical Therapy   Treatment Note     Name: Haresh Hood  Clinic Number: [id number]    Therapy Diagnosis:   Encounter Diagnoses   Name Primary?    Lumbar pain Yes    Pelvic floor dysfunction        Physician: Shannan Zavala MD    Visit Date: 10/23/2023    Physician Orders: PT Eval and Treat   Medical Diagnosis from Referral: Pain [R52]  Evaluation Date: 10/13/2023  Authorization Period Expiration: 03/28/2024  Plan of Care Expiration: 11/24/2023  Progress Note Due: 11/13/2023  Visit # / Visits authorized: 4/ 20 + eval  FOTO: Issued Visit #: 1 /3 (pelvic floor and lumbar)     Precautions: Standard, 4 weeks postpartum      Time In: 1:30  Time Out: 2:25  Total Appointment Time (timed & untimed codes): 55 minutes    Subjective     Pt reports: 10/10 pain recently with prolonged standing, walking, and when getting in and out the bed. She states that she started back walking on her treadmill during her third trimester which is when the pain started. She reports only eating one meal a day and has good water intake. She is also reporting pressure at her lower left abdominal region. She is continuing to strain with bowel movements.     She was compliant with home exercise program.  Response to previous treatment: increased pain and stiffness  Functional change: no change     Pain: 0/10 now; however 10/10 with activity at home (unable to elicit pain during session)  Location: left low back     Objective     Objective Measures updated at progress report unless specified.     Treatment     Haresh received therapeutic exercises to develop  strength, endurance, ROM, flexibility, posture, and core stabilization for 00 minutes including:     Bike 6 minutes     Haresh received the following manual therapy techniques: to develop flexibility and extensibility for 00 minutes including:     Scar mobilizations techniques- circles around suprapubic scar       Haresh participated in neuromuscular re-education activities  to develop Coordination, Control, Down training, Proprioception, and Sense for 45 minutes including:     Piriformis stretch 2 min NICOLAS  Prone wind shield wipers 20x   Prone hip extension x12 ea.   SL hip abduction x12 ea.  Supine SLR with maternity belt + DB + TA contraction   Posterior pelvic tilts with deep breathing technique x20 - with belt  Bridge with ball squeeze, TA contraction, and deep breathing x15 - with belt   Cat cow x20- with belt  Bridge at wall with ball squeeze and LLE higher x20    Not today:  Diaphragmatic breathing   TA contractions with diaphragmatic breathing x15  Openbooks with bolster 2x10   LTRs 2 minutes   Multifidus contraction x10 ea.  Bird dog LE only x10 ea.  Paloff press   Magda pose 2 minutes     Haresh participated in dynamic functional therapeutic activities to improve functional performance for 00 minutes, including:    Log rolling technique- patient able to demonstrate   Proper form and posture when picking up baby from lower surface   Colon massage educated on and demonstrated technique     Hot pack to left hip for 10 minutes.     Home Exercises Provided and Patient Education Provided     Education provided:   - bladder irritants, anatomy/physiology of pelvic floor, posture/body mechanices, bladder retraining, diaphragmatic breathing, double voiding techniques, isometric abdominal exercises, proper bearing down techniques, and fluid intake/dietary modifications  Discussed progression of plan of care with patient; educated pt in activity modification; reviewed HEP with pt. Pt demonstrated and verbalized understanding of all instruction and was provided with a handout of HEP (see Patient Instructions).      Written Home Exercises Provided: yes.  Exercises were reviewed and Haresh was able to demonstrate them prior to the end of the session.  Haresh demonstrated good  understanding of the education provided.     See EMR under Patient Instructions for exercises provided prior  visit.    Assessment     Haresh presents to therapy today with 10/10 pain at in left hip with functional activities. Pain has progressively gotten worse since delivery. She is reporting radiating and N/T symptoms in left glute/hip with ADLs and transfers. Introduced piriformis stretch and given addition HEP handout. Continuing to work on glute strengthening and pelvic stabilization exercises. Patient recommended to continue pelvic rest per MD orders. Recommended patient try proper toileting posture for smoother BM. Continue progressing in POC as tolerated.     Haresh Is not progressing well towards her goals.   Pt prognosis is Good.     Pt will continue to benefit from skilled outpatient physical therapy to address the deficits listed in the problem list box on initial evaluation, provide pt/family education and to maximize pt's level of independence in the home and community environment.     Pt's spiritual, cultural and educational needs considered and pt agreeable to plan of care and goals.     Anticipated barriers to physical therapy:  none    Goals:   Short Term Goals:  3 weeks  HEP: Patient will demonstrate 50% independence with HEP. Progressing  Pain: Pt will demonstrate improved pain less than or equal to 3/10 at worse. Progressing  Function: Patient will increase functional score to equal or greater than to 70 out of 100 on FOTO. Progressing  Mobility: Patient will improve trunk rotation by 15%. Progressing  Strength: Patient will improve impaired strength to greater or equal to 4/5. Progressing  Pelvic Floor: Patient will demonstrate excellent knowledge and adherence to HEP to facilitate optimal recovery. Progressing  Pelvic Floor: Patient will demonstrate proper PFM contraction, relaxation, and lengthening coordinated with TA and breath for improved muscle coordination needed for functional activity. Progressing     Long Term Goals:  4 weeks  HEP: Pt will be independent with advanced HEP.  Progressing  Pain: Pt will demonstrate improved pain less than or equal to 1/10 at worse in order to progress toward maximal functional ability and improve QOL. Progressing  Function: Patient will  increase functional score to equal or greater than to 75 out of 100 on FOTO to improve functional independence and quality of life.  Progressing  Mobility: Patient will demonstrate no pain with trunk ROM to return to prior level of function.   Strength: Patient will improve impaired strength to greater or equal to 4+/5 in order to return to PLOF. Progressing  Pt will be able to bend forward with no increase of pain in order to care for baby. Progressing  Pelvic Floor: Patient to report a decrease in pain to no more than 2/10 at it's worst with intercourse. Progressing  Pelvic Floor: Patient to report improved restorative sleeping, waking up no more than 1x/night due to urge to urinate. Progressing  Pelvic Floor: Patient to demonstrate proper positioning on commode with breathing techniques to decrease strain with BM to enable pt to feel empty after BM. Progressing  Pelvic Floor: Patient to demonstrate independence with performing bowel massage to help with gut motility. Progressing     PLAN   Plan of care Certification: 10/5/2023 to 11/17/2023.     Outpatient Physical Therapy 2 times weekly for 6 weeks to include the following interventions: Cervical/Lumbar Traction, Electrical Stimulation  , Gait Training, Manual Therapy, Moist Heat/ Ice, Neuromuscular Re-ed, Patient Education, Self Care, Therapeutic Activities, Therapeutic Exercise, and FDN .        Steve Tinsley, PT

## 2023-10-26 ENCOUNTER — CLINICAL SUPPORT (OUTPATIENT)
Dept: REHABILITATION | Facility: HOSPITAL | Age: 23
End: 2023-10-26
Payer: MEDICAID

## 2023-10-26 DIAGNOSIS — M54.50 LUMBAR PAIN: Primary | ICD-10-CM

## 2023-10-26 DIAGNOSIS — M62.89 PELVIC FLOOR DYSFUNCTION: ICD-10-CM

## 2023-10-26 PROCEDURE — 97110 THERAPEUTIC EXERCISES: CPT | Mod: PN

## 2023-10-26 NOTE — PROGRESS NOTES
Pelvic Health Physical Therapy   Treatment Note     Name: Haresh Hood  Clinic Number: [id number]    Therapy Diagnosis:   Encounter Diagnoses   Name Primary?    Lumbar pain Yes    Pelvic floor dysfunction        Physician: Shannan Zavala MD    Visit Date: 10/26/2023    Physician Orders: PT Eval and Treat   Medical Diagnosis from Referral: Pain [R52]  Evaluation Date: 10/13/2023  Authorization Period Expiration: 03/28/2024  Plan of Care Expiration: 11/24/2023  Progress Note Due: 11/13/2023  Visit # / Visits authorized: 5/ 20 + eval  FOTO: Issued Visit #: 1 /3 (pelvic floor and lumbar)     Precautions: Standard, 4 weeks postpartum      Time In: 2:45  Time Out: 3:37  Total Appointment Time (timed & untimed codes): 52 minutes (42 minutes billable + 10 minutes nonbillable)    Subjective     Pt reports: continues to note pain with transitional movements, such as, getting in and out of car/bed and standing from sitting position. Greatest pain noted towards end of the day.   She was compliant with home exercise program.  Response to previous treatment: increased pain and stiffness  Functional change: no change     Pain: 7/10   Location: left low back     Objective     Objective Measures updated at progress report unless specified.     Treatment     Haresh received therapeutic exercises to develop  strength, endurance, ROM, flexibility, posture, and core stabilization for 00 minutes including:   NOT TODAY:   Bike 6 minutes     Haresh received the following manual therapy techniques: to develop flexibility and extensibility for 00 minutes including:   NOT TODAY: Scar mobilizations techniques- circles around suprapubic scar     Haresh participated in neuromuscular re-education activities to develop Coordination, Control, Down training, Proprioception, and Sense for 34 minutes including:   Posterior pelvic tilts x10  Bridge with neutral pelvic x10 ea   Single leg Bridge with neutral pelvic x10 ea  Quad single leg lift  Multifidus contraction with blue oval disc 2x10 ea.  Quad double leg lift Multifidus contraction 2x10   Cat cow x20    Not today:  Piriformis stretch 2 min NICOLAS  Prone wind shield wipers 20x   Prone hip extension x12 ea.   SL hip abduction x12 ea.  Supine SLR with maternity belt + DB + TA contraction   Bridge at wall with ball squeeze and LLE higher x20  Diaphragmatic breathing   TA contractions with diaphragmatic breathing x15  Openbooks with bolster 2x10   LTRs 2 minutes   Bird dog LE only x10 ea.  Paloff press   Magda pose 2 minutes     Haresh participated in dynamic functional therapeutic activities to improve functional performance for 08 minutes, including:  Discussed and went over getting in and out of car with decreased excessive hip abduction    NOT TODAY:   Log rolling technique- patient able to demonstrate   Proper form and posture when picking up baby from lower surface   Colon massage educated on and demonstrated technique     Hot pack to left hip for 10 minutes.     Home Exercises Provided and Patient Education Provided     Education provided:   - bladder irritants, anatomy/physiology of pelvic floor, posture/body mechanices, bladder retraining, diaphragmatic breathing, double voiding techniques, isometric abdominal exercises, proper bearing down techniques, and fluid intake/dietary modifications  Discussed progression of plan of care with patient; educated pt in activity modification; reviewed HEP with pt. Pt demonstrated and verbalized understanding of all instruction and was provided with a handout of HEP (see Patient Instructions).      Written Home Exercises Provided: yes.  Exercises were reviewed and Haresh was able to demonstrate them prior to the end of the session.  Haresh demonstrated good  understanding of the education provided.     See EMR under Patient Instructions for exercises provided prior visit.    Assessment     Pt presents to clinic with pain to left low back region. Focused on  core stability and neutral pelvic position during today's session. Pt demonstrates poor stability demonstrated by quick fatigue and increase shaking during neuromuscular re-education exercises. Provided heat for pain reduction and discussed avoiding excessive hip abduction with car transfers. Assessed and reviewed car transfer at end of session. Pt demonstrated no pain with car transfer bringing both knees into car at the same time. Will continue to further assess and progress as tolerated.     Haresh Is not progressing well towards her goals.   Pt prognosis is Good.     Pt will continue to benefit from skilled outpatient physical therapy to address the deficits listed in the problem list box on initial evaluation, provide pt/family education and to maximize pt's level of independence in the home and community environment.     Pt's spiritual, cultural and educational needs considered and pt agreeable to plan of care and goals.     Anticipated barriers to physical therapy:  none    Goals:   Short Term Goals:  3 weeks  HEP: Patient will demonstrate 50% independence with HEP. Progressing  Pain: Pt will demonstrate improved pain less than or equal to 3/10 at worse. Progressing  Function: Patient will increase functional score to equal or greater than to 70 out of 100 on FOTO. Progressing  Mobility: Patient will improve trunk rotation by 15%. Progressing  Strength: Patient will improve impaired strength to greater or equal to 4/5. Progressing  Pelvic Floor: Patient will demonstrate excellent knowledge and adherence to HEP to facilitate optimal recovery. Progressing  Pelvic Floor: Patient will demonstrate proper PFM contraction, relaxation, and lengthening coordinated with TA and breath for improved muscle coordination needed for functional activity. Progressing     Long Term Goals:  4 weeks  HEP: Pt will be independent with advanced HEP. Progressing  Pain: Pt will demonstrate improved pain less than or equal to 1/10 at  worse in order to progress toward maximal functional ability and improve QOL. Progressing  Function: Patient will  increase functional score to equal or greater than to 75 out of 100 on FOTO to improve functional independence and quality of life.  Progressing  Mobility: Patient will demonstrate no pain with trunk ROM to return to prior level of function.   Strength: Patient will improve impaired strength to greater or equal to 4+/5 in order to return to PLOF. Progressing  Pt will be able to bend forward with no increase of pain in order to care for baby. Progressing  Pelvic Floor: Patient to report a decrease in pain to no more than 2/10 at it's worst with intercourse. Progressing  Pelvic Floor: Patient to report improved restorative sleeping, waking up no more than 1x/night due to urge to urinate. Progressing  Pelvic Floor: Patient to demonstrate proper positioning on commode with breathing techniques to decrease strain with BM to enable pt to feel empty after BM. Progressing  Pelvic Floor: Patient to demonstrate independence with performing bowel massage to help with gut motility. Progressing     PLAN   Plan of care Certification: 10/5/2023 to 11/17/2023.     Outpatient Physical Therapy 2 times weekly for 6 weeks to include the following interventions: Cervical/Lumbar Traction, Electrical Stimulation  , Gait Training, Manual Therapy, Moist Heat/ Ice, Neuromuscular Re-ed, Patient Education, Self Care, Therapeutic Activities, Therapeutic Exercise, and FDN .        Dorene Briggs, PT

## 2023-11-01 PROBLEM — M62.89 PELVIC FLOOR DYSFUNCTION: Status: RESOLVED | Noted: 2023-10-09 | Resolved: 2023-11-01

## 2023-11-01 PROBLEM — Z09 POSTOP CHECK: Status: RESOLVED | Noted: 2023-10-04 | Resolved: 2023-11-01

## 2023-11-06 ENCOUNTER — CLINICAL SUPPORT (OUTPATIENT)
Dept: REHABILITATION | Facility: HOSPITAL | Age: 23
End: 2023-11-06
Payer: MEDICAID

## 2023-11-06 ENCOUNTER — DOCUMENTATION ONLY (OUTPATIENT)
Dept: REHABILITATION | Facility: HOSPITAL | Age: 23
End: 2023-11-06
Payer: MEDICAID

## 2023-11-06 DIAGNOSIS — M54.50 LUMBAR PAIN: Primary | ICD-10-CM

## 2023-11-06 PROCEDURE — 97110 THERAPEUTIC EXERCISES: CPT | Mod: PN

## 2023-11-06 NOTE — PROGRESS NOTES
PT/PTA met face to face to discuss pt's treatment plan and progress towards established goals. Pt will be seen by a physical therapist minimally every 6th visit or every 30 days.    Dora Torres PTA

## 2023-11-06 NOTE — PROGRESS NOTES
Pelvic Health Physical Therapy   Treatment Note     Name: Haresh Hood  Clinic Number: [id number]    Therapy Diagnosis:   Encounter Diagnosis   Name Primary?    Lumbar pain Yes       Physician: Shannan Zavala MD    Visit Date: 11/6/2023    Physician Orders: PT Eval and Treat   Medical Diagnosis from Referral: Pain [R52]  Evaluation Date: 10/13/2023  Authorization Period Expiration: 03/28/2024  Plan of Care Expiration: 11/24/2023  Progress Note Due: 11/13/2023  Visit # / Visits authorized: 6/ 20 + eval  FOTO: Issued Visit #: 1 /3 (pelvic floor and lumbar)       Precautions: Standard, 4 weeks postpartum      Time In: 2:00  Time Out: 2:45  Total Appointment Time (timed & untimed codes): 45 minutes    Subjective     Pt reports: pain in left sided low back radiates down left thigh.  She was compliant with home exercise program.  Response to previous treatment: felt better  Functional change: no change     Pain: 6-9/10   Location: left low back     Objective     Objective Measures updated at progress report unless specified.     Treatment     Haresh received therapeutic exercises to develop  strength, endurance, ROM, flexibility, posture, and core stabilization for 00 minutes including:   NOT TODAY:   Bike 6 minutes     Haresh received the following manual therapy techniques: to develop flexibility and extensibility for 10 minutes including:   MFR to left piriformis  NOT TODAY: Scar mobilizations techniques- circles around suprapubic scar     Haresh participated in neuromuscular re-education activities to develop Coordination, Control, Down training, Proprioception, and Sense for 35 minutes including:   Posterior pelvic tilts x10  Bridge with neutral pelvic x10 ea   Single leg Bridge with neutral pelvic x10 ea  Cat cow x20  Quad single leg lift Multifidus contraction with blue oval disc 2x10 ea.  Quad double leg lift Multifidus contraction 2x10   SL hip abduction/ extension x12 ea.  PEC facilitation - bridges  against wall with ball squeeze, then single leg  Sit to stand with proper breathing x10    Not today:  Piriformis stretch 2 min NICOLAS  Prone wind shield wipers 20x   Prone hip extension x12 ea.   Supine SLR with maternity belt + DB + TA contraction   Bridge at wall with ball squeeze and LLE higher x20  Diaphragmatic breathing   TA contractions with diaphragmatic breathing x15  Openbooks with bolster 2x10   LTRs 2 minutes   Bird dog LE only x10 ea.  Paloff press   Magda pose 2 minutes     Haresh participated in dynamic functional therapeutic activities to improve functional performance for 08 minutes, including:  Discussed and went over getting in and out of car with decreased excessive hip abduction    NOT TODAY:   Log rolling technique- patient able to demonstrate   Proper form and posture when picking up baby from lower surface   Colon massage educated on and demonstrated technique     Hot pack to left hip for 0 minutes.     Home Exercises Provided and Patient Education Provided     Education provided:   - bladder irritants, anatomy/physiology of pelvic floor, posture/body mechanices, bladder retraining, diaphragmatic breathing, double voiding techniques, isometric abdominal exercises, proper bearing down techniques, and fluid intake/dietary modifications  Discussed progression of plan of care with patient; educated pt in activity modification; reviewed HEP with pt. Pt demonstrated and verbalized understanding of all instruction and was provided with a handout of HEP (see Patient Instructions).  -use of lubricant with intercourse      Written Home Exercises Provided: yes.  Exercises were reviewed and Haresh was able to demonstrate them prior to the end of the session.  Haresh demonstrated good  understanding of the education provided.     See EMR under Patient Instructions for exercises provided prior visit.    Assessment     Pt presents to clinic with pain to left low back and glute . Manual therapy performed to  this area to address tension. Patient demonstrates great tolerance to core strengthening exercises and additional posterior chain strengthening. Discussed reports of pressure in suprapubic region which may be secondary to scar tissue or pelvic floor dysfunction. Benefits of internal exam discussed with patient and pelvic floor PT to address pain with intercourse and decreased urge to urinate.     Haresh Is not progressing well towards her goals.   Pt prognosis is Good.     Pt will continue to benefit from skilled outpatient physical therapy to address the deficits listed in the problem list box on initial evaluation, provide pt/family education and to maximize pt's level of independence in the home and community environment.     Pt's spiritual, cultural and educational needs considered and pt agreeable to plan of care and goals.     Anticipated barriers to physical therapy:  none    Goals:   Short Term Goals:  3 weeks  HEP: Patient will demonstrate 50% independence with HEP. Progressing  Pain: Pt will demonstrate improved pain less than or equal to 3/10 at worse. Progressing  Function: Patient will increase functional score to equal or greater than to 70 out of 100 on FOTO. Progressing  Mobility: Patient will improve trunk rotation by 15%. Progressing  Strength: Patient will improve impaired strength to greater or equal to 4/5. Progressing  Pelvic Floor: Patient will demonstrate excellent knowledge and adherence to HEP to facilitate optimal recovery. Progressing  Pelvic Floor: Patient will demonstrate proper PFM contraction, relaxation, and lengthening coordinated with TA and breath for improved muscle coordination needed for functional activity. Progressing     Long Term Goals:  4 weeks  HEP: Pt will be independent with advanced HEP. Progressing  Pain: Pt will demonstrate improved pain less than or equal to 1/10 at worse in order to progress toward maximal functional ability and improve QOL. Progressing  Function:  Patient will  increase functional score to equal or greater than to 75 out of 100 on FOTO to improve functional independence and quality of life.  Progressing  Mobility: Patient will demonstrate no pain with trunk ROM to return to prior level of function.   Strength: Patient will improve impaired strength to greater or equal to 4+/5 in order to return to PLOF. Progressing  Pt will be able to bend forward with no increase of pain in order to care for baby. Progressing  Pelvic Floor: Patient to report a decrease in pain to no more than 2/10 at it's worst with intercourse. Progressing  Pelvic Floor: Patient to report improved restorative sleeping, waking up no more than 1x/night due to urge to urinate. Progressing  Pelvic Floor: Patient to demonstrate proper positioning on commode with breathing techniques to decrease strain with BM to enable pt to feel empty after BM. Progressing  Pelvic Floor: Patient to demonstrate independence with performing bowel massage to help with gut motility. Progressing     PLAN   Plan of care Certification: 10/5/2023 to 11/17/2023.     Outpatient Physical Therapy 2 times weekly for 6 weeks to include the following interventions: Cervical/Lumbar Traction, Electrical Stimulation  , Gait Training, Manual Therapy, Moist Heat/ Ice, Neuromuscular Re-ed, Patient Education, Self Care, Therapeutic Activities, Therapeutic Exercise, and FDN .        Dora Torres, PTA

## 2023-11-08 ENCOUNTER — CLINICAL SUPPORT (OUTPATIENT)
Dept: REHABILITATION | Facility: HOSPITAL | Age: 23
End: 2023-11-08
Payer: MEDICAID

## 2023-11-08 DIAGNOSIS — M54.50 LUMBAR PAIN: Primary | ICD-10-CM

## 2023-11-08 PROCEDURE — 97110 THERAPEUTIC EXERCISES: CPT | Mod: PN

## 2023-11-08 NOTE — PROGRESS NOTES
Pelvic Health Physical Therapy   Treatment Note     Name: Haresh Hood  [facility] Number: [id number]    Therapy Diagnosis:   Encounter Diagnosis   Name Primary?    Lumbar pain Yes         Physician: Shannan Zavala MD    Visit Date: 11/8/2023    Physician Orders: PT Eval and Treat   Medical Diagnosis from Referral: Pain [R52]  Evaluation Date: 10/13/2023  Authorization Period Expiration: 03/28/2024  Plan of Care Expiration: 11/16/2023 NEXT VISIT  Progress Note Due: 11/13/2023 TODAY  Visit # / Visits authorized: 7/ 20 + eval  FOTO: Issued Visit #: 1 /3 (pelvic floor and lumbar)       Precautions: Standard, 7 weeks postpartum      Time In: 1:27  Time Out: 2:17  Total Appointment Time (timed & untimed codes): 50 minutes    Subjective     Pt reports: recent intercourse pain after being released from pelvic rest. She reports that he was hitting a wall and slight burning sensation. She rates her intercourse pain a 7/10. She is continuing to have low back pain. She will tend to take a walk to alleviate the pain however after about 2 hours her pain will increase.   She was compliant with home exercise program.  Response to previous treatment: tends to feel better after therapy; however she reports increased pain at night the same day  Functional change: no change     Pain: 6/10   Location: left low back     Objective     Objective Measures updated at progress report unless specified.     VAGINAL PELVIC FLOOR EXAM    EXTERNAL ASSESSMENT  Introitus: stenotic  Skin condition: WNL  Scarring: none noted   Sensation: WNL   Pain:  none  Voluntary contraction: visible lift  Voluntary relaxation: visible drop and slow drop  Involuntary contraction: reflex tightening  Bearing down: bulge      INTERNAL ASSESSMENT  Pain: trigger points as follows: bilateral superficial transverse perineal, left bulbospongiosus, bilateral coccygeus, left pubococcygeus   Sensation: able to sense generalized pressure, unable to identify  location   Vaginal vault: stenotic   Muscle Bulk: hypertonus   Muscle Power: 2-/5  Muscle Endurance: 7 sec    Quality of contraction: slow relaxation   Coordination: tends to hold breath during PFM contration   Comments: patient reporting       Treatment     Haresh received therapeutic exercises to develop  strength, endurance, ROM, flexibility, posture, and core stabilization for 00 minutes including:     NOT TODAY:   Bike 6 minutes     Haresh received the following manual therapy techniques: to develop flexibility and extensibility for     Internal trigger point release to muscles listed above.       Haresh participated in neuromuscular re-education activities to develop Coordination, Control, Down training, Proprioception, and Sense for 35 minutes including:     (SI belt donned throughout treatment)  Happy baby   Magda pose  Cat cow  Bridges on wall with LLE higher on wall 2x10    Not today:   Posterior pelvic tilts x10  Bridge with neutral pelvic x10 ea   Single leg Bridge with neutral pelvic x10 ea  Cat cow x20  Quad single leg lift Multifidus contraction with blue oval disc 2x10 ea.  Quad double leg lift Multifidus contraction 2x10   SL hip abduction/ extension x12 ea.  PEC facilitation - bridges against wall with ball squeeze, then single leg  Sit to stand with proper breathing x10  Piriformis stretch 2 min NICOLAS  Prone wind shield wipers 20x   Prone hip extension x12 ea.   Supine SLR with maternity belt + DB + TA contraction   Bridge at wall with ball squeeze and LLE higher x20  Diaphragmatic breathing   TA contractions with diaphragmatic breathing x15  Openbooks with bolster 2x10   LTRs 2 minutes   Bird dog LE only x10 ea.  Paloff press   Magda pose 2 minutes     Haresh participated in dynamic functional therapeutic activities to improve functional performance for -- minutes, including:  Discussed and went over getting in and out of car with decreased excessive hip abduction    NOT TODAY:   Log rolling  technique- patient able to demonstrate   Proper form and posture when picking up baby from lower surface   Colon massage educated on and demonstrated technique     Hot pack to left hip for 0 minutes.     Home Exercises Provided and Patient Education Provided     Education provided:   - bladder irritants, anatomy/physiology of pelvic floor, posture/body mechanices, bladder retraining, diaphragmatic breathing, double voiding techniques, isometric abdominal exercises, proper bearing down techniques, and fluid intake/dietary modifications  Discussed progression of plan of care with patient; educated pt in activity modification; reviewed HEP with pt. Pt demonstrated and verbalized understanding of all instruction and was provided with a handout of HEP (see Patient Instructions).  -use of lubricant with intercourse      Written Home Exercises Provided: yes.  Exercises were reviewed and Haresh was able to demonstrate them prior to the end of the session.  Haresh demonstrated good  understanding of the education provided.     See EMR under Patient Instructions for exercises provided prior visit.    Assessment     Haresh presents to therapy today with continued SI joint pain with transfers and after prolonged walking. Performed intravaginal assessment today to determine tone and strength of pelvic floor since recent pain with intercourse. Patient reporting that she was anticipating pain during the assessment. Noted trigger points in bilateral superficial transverse perineal, left bulbospongiosus, bilateral coccygeus, and left pubococcygeus. Educated patient on pelvic floor relaxation and downtraining. Introduced flexibility exercises to improve pelvic floor muscle relaxation. Will continue progressing in POC as tolerated.     Haresh Is not progressing well towards her goals.   Pt prognosis is Good.     Pt will continue to benefit from skilled outpatient physical therapy to address the deficits listed in the problem list box  on initial evaluation, provide pt/family education and to maximize pt's level of independence in the home and community environment.     Pt's spiritual, cultural and educational needs considered and pt agreeable to plan of care and goals.     Anticipated barriers to physical therapy:  none    Goals:   Short Term Goals:  3 weeks  HEP: Patient will demonstrate 50% independence with HEP. Progressing  Pain: Pt will demonstrate improved pain less than or equal to 3/10 at worse. Progressing  Function: Patient will increase functional score to equal or greater than to 70 out of 100 on FOTO. Progressing  Mobility: Patient will improve trunk rotation by 15%. Progressing  Strength: Patient will improve impaired strength to greater or equal to 4/5. Progressing  Pelvic Floor: Patient will demonstrate excellent knowledge and adherence to HEP to facilitate optimal recovery. Progressing  Pelvic Floor: Patient will demonstrate proper PFM contraction, relaxation, and lengthening coordinated with TA and breath for improved muscle coordination needed for functional activity. Progressing     Long Term Goals:  4 weeks  HEP: Pt will be independent with advanced HEP. Progressing  Pain: Pt will demonstrate improved pain less than or equal to 1/10 at worse in order to progress toward maximal functional ability and improve QOL. Progressing  Function: Patient will  increase functional score to equal or greater than to 75 out of 100 on FOTO to improve functional independence and quality of life.  Progressing  Mobility: Patient will demonstrate no pain with trunk ROM to return to prior level of function.   Strength: Patient will improve impaired strength to greater or equal to 4+/5 in order to return to PLOF. Progressing  Pt will be able to bend forward with no increase of pain in order to care for baby. Progressing  Pelvic Floor: Patient to report a decrease in pain to no more than 2/10 at it's worst with intercourse. Progressing  Pelvic  Floor: Patient to report improved restorative sleeping, waking up no more than 1x/night due to urge to urinate. Progressing  Pelvic Floor: Patient to demonstrate proper positioning on commode with breathing techniques to decrease strain with BM to enable pt to feel empty after BM. Progressing  Pelvic Floor: Patient to demonstrate independence with performing bowel massage to help with gut motility. Progressing     PLAN   Plan of care Certification: 10/5/2023 to 11/17/2023.     Outpatient Physical Therapy 2 times weekly for 6 weeks to include the following interventions: Cervical/Lumbar Traction, Electrical Stimulation  , Gait Training, Manual Therapy, Moist Heat/ Ice, Neuromuscular Re-ed, Patient Education, Self Care, Therapeutic Activities, Therapeutic Exercise, and FDN .        Steve Tinsley, PT

## 2023-11-13 ENCOUNTER — CLINICAL SUPPORT (OUTPATIENT)
Dept: REHABILITATION | Facility: HOSPITAL | Age: 23
End: 2023-11-13
Payer: MEDICAID

## 2023-11-13 DIAGNOSIS — M54.50 LUMBAR PAIN: Primary | ICD-10-CM

## 2023-11-13 PROCEDURE — 97110 THERAPEUTIC EXERCISES: CPT | Mod: PN

## 2023-11-13 NOTE — PROGRESS NOTES
Pelvic Health Physical Therapy   Treatment Note     Name: Haresh Hood  [facility] Number: [id number]    Therapy Diagnosis:   Encounter Diagnosis   Name Primary?    Lumbar pain Yes       Physician: Shannan Zavala MD    Visit Date: 2023    Physician Orders: PT Eval and Treat   Medical Diagnosis from Referral: Pain [R52]  Evaluation Date: 10/13/2023  Authorization Period Expiration: 2024  Plan of Care Expiration: 2023 TODAY  Progress Note Due: 2023  Visit # / Visits authorized:  + eval  FOTO: Issued Visit #: 1 /3 (pelvic floor and lumbar)       Precautions: Standard, 7 weeks postpartum      Time In: 1:30  Time Out: 2:25  Total Appointment Time (timed & untimed codes): 45 minutes    Subjective     Pt reports: left hip and low back not as painful as before. She describes he pain as soreness. She is mainly hurting when getting in and out of the bed. She rated recent intercourse a 3/10. She notices that she tenses up before and once she relaxes the pain will decrease. She has been having a BM 2-3 times a week. Continues to report numbness in  scar.     She was compliant with home exercise program.  Response to previous treatment: no adverse symptoms  Functional change: no change     Pain: 4/10   Location: left low back     Objective     Objective Measures updated at progress report unless specified.     Treatment     Haresh received therapeutic exercises to develop  strength, endurance, ROM, flexibility, posture, and core stabilization for 00 minutes including:     NOT TODAY:   Bike 6 minutes     Haresh received the following manual therapy techniques: to develop flexibility and extensibility for     Internal trigger point release to muscles listed above.       Haresh participated in neuromuscular re-education activities to develop Coordination, Control, Down training, Proprioception, and Sense for 45 minutes including:     Diaphragmatic breathing 2 min  TA contractions supine  "x12  + bridge with ball x10  + marching x5 ea.  BKFO red TheraBand 2x10 ea.   SKTC 3x30"   Happy baby 2 min.   Magda pose 2 min.   Prone hip ext 2x10  Cat cow   Bridges on wall with LLE higher on wall 2x10    Not today:   Posterior pelvic tilts x10  Bridge with neutral pelvic x10 ea   Single leg Bridge with neutral pelvic x10 ea  Cat cow x20  Quad single leg lift Multifidus contraction with blue oval disc 2x10 ea.  Quad double leg lift Multifidus contraction 2x10   SL hip abduction/ extension x12 ea.  PEC facilitation - bridges against wall with ball squeeze, then single leg  Sit to stand with proper breathing x10  Piriformis stretch 2 min NICOLAS  Prone wind shield wipers 20x   Prone hip extension x12 ea.   Supine SLR with maternity belt + DB + TA contraction   Bridge at wall with ball squeeze and LLE higher x20  Diaphragmatic breathing   TA contractions with diaphragmatic breathing x15  Openbooks with bolster 2x10   LTRs 2 minutes   Bird dog LE only x10 ea.  Paloff press   Magda pose 2 minutes     Haresh participated in dynamic functional therapeutic activities to improve functional performance for -- minutes, including:  Discussed and went over getting in and out of car with decreased excessive hip abduction    NOT TODAY:   Log rolling technique- patient able to demonstrate   Proper form and posture when picking up baby from lower surface   Colon massage educated on and demonstrated technique     Hot pack to left hip for 10 minutes.     Home Exercises Provided and Patient Education Provided     Education provided:   - bladder irritants, anatomy/physiology of pelvic floor, posture/body mechanices, bladder retraining, diaphragmatic breathing, double voiding techniques, isometric abdominal exercises, proper bearing down techniques, and fluid intake/dietary modifications  Discussed progression of plan of care with patient; educated pt in activity modification; reviewed HEP with pt. Pt demonstrated and verbalized " understanding of all instruction and was provided with a handout of HEP (see Patient Instructions).  -use of lubricant with intercourse      Written Home Exercises Provided: yes.  Exercises were reviewed and Haresh was able to demonstrate them prior to the end of the session.  Haresh demonstrated good  understanding of the education provided.     See EMR under Patient Instructions for exercises provided prior visit.    Assessment     Haresh tolerated therapy session well with no new complaints of pain. Continued focus on pelvic floor relaxation with diaphragmatic breathing. Patient able to tolerate light hip strengthening exercises with good form and eccentric control. Patient stating she would like to get back into the gym. Recommended patient take it slow and progress as tolerated. Continue progressing in POC as tolerated.     Haresh Is not progressing well towards her goals.   Pt prognosis is Good.     Pt will continue to benefit from skilled outpatient physical therapy to address the deficits listed in the problem list box on initial evaluation, provide pt/family education and to maximize pt's level of independence in the home and community environment.     Pt's spiritual, cultural and educational needs considered and pt agreeable to plan of care and goals.     Anticipated barriers to physical therapy:  none    Goals:   Short Term Goals:  3 weeks  HEP: Patient will demonstrate 50% independence with HEP. Progressing  Pain: Pt will demonstrate improved pain less than or equal to 3/10 at worse. Progressing  Function: Patient will increase functional score to equal or greater than to 70 out of 100 on FOTO. Progressing  Mobility: Patient will improve trunk rotation by 15%. Progressing  Strength: Patient will improve impaired strength to greater or equal to 4/5. Progressing  Pelvic Floor: Patient will demonstrate excellent knowledge and adherence to HEP to facilitate optimal recovery. Progressing  Pelvic Floor:  Patient will demonstrate proper PFM contraction, relaxation, and lengthening coordinated with TA and breath for improved muscle coordination needed for functional activity. Progressing     Long Term Goals:  4 weeks  HEP: Pt will be independent with advanced HEP. Progressing  Pain: Pt will demonstrate improved pain less than or equal to 1/10 at worse in order to progress toward maximal functional ability and improve QOL. Progressing  Function: Patient will  increase functional score to equal or greater than to 75 out of 100 on FOTO to improve functional independence and quality of life.  Progressing  Mobility: Patient will demonstrate no pain with trunk ROM to return to prior level of function.   Strength: Patient will improve impaired strength to greater or equal to 4+/5 in order to return to PLOF. Progressing  Pt will be able to bend forward with no increase of pain in order to care for baby. Progressing  Pelvic Floor: Patient to report a decrease in pain to no more than 2/10 at it's worst with intercourse. Progressing  Pelvic Floor: Patient to report improved restorative sleeping, waking up no more than 1x/night due to urge to urinate. Progressing  Pelvic Floor: Patient to demonstrate proper positioning on commode with breathing techniques to decrease strain with BM to enable pt to feel empty after BM. Progressing  Pelvic Floor: Patient to demonstrate independence with performing bowel massage to help with gut motility. Progressing     PLAN   Plan of care Certification: 10/5/2023 to 11/17/2023.     Outpatient Physical Therapy 2 times weekly for 6 weeks to include the following interventions: Cervical/Lumbar Traction, Electrical Stimulation  , Gait Training, Manual Therapy, Moist Heat/ Ice, Neuromuscular Re-ed, Patient Education, Self Care, Therapeutic Activities, Therapeutic Exercise, and FDN .        Steve Tinsley, PT

## 2023-12-04 ENCOUNTER — CLINICAL SUPPORT (OUTPATIENT)
Dept: REHABILITATION | Facility: HOSPITAL | Age: 23
End: 2023-12-04
Payer: MEDICAID

## 2023-12-04 DIAGNOSIS — M54.50 LUMBAR PAIN: Primary | ICD-10-CM

## 2023-12-04 PROCEDURE — 97110 THERAPEUTIC EXERCISES: CPT | Mod: PN

## 2023-12-04 NOTE — PROGRESS NOTES
Pelvic Health Physical Therapy   Treatment Note     Name: Haresh Hood  [facility] Number: [id number]    Therapy Diagnosis:   Encounter Diagnosis   Name Primary?    Lumbar pain Yes       Physician: Shannan Zavala MD    Visit Date: 12/4/2023    Physician Orders: PT Eval and Treat   Medical Diagnosis from Referral: Pain [R52]  Evaluation Date: 10/13/2023  Authorization Period Expiration: 03/28/2024  Plan of Care Expiration: 11/16/2023   Progress Note Due: 12/09/2023  Visit # / Visits authorized: 9/ 20 + eval  FOTO: Issued Visit #: 1 /3 (pelvic floor and lumbar)       Precautions: Standard, 7 weeks postpartum      Time In: 4:05  Time Out: 4:45  Total Appointment Time (timed & untimed codes): 40 minutes    Subjective     Pt reports: she was unable to attend therapy secondary to difficulties finding a . Continues to have intermittent pain to left low back.     She was compliant with home exercise program.  Response to previous treatment: no adverse symptoms  Functional change: no change     Pain: 3/10   Location: left low back     Objective     SFMA:  Top Tiers Right  (Spine) Left Notes    Multi-segmental   Flexion DN proximal knee       Multi-segmental Extension FN       Multi-segmental Rotation  FN DN 10% limited     Single Leg Stance  (10 sec) FN FN     FN = Functional Normal   FP = Functional Painful  DN = Dysfunctional Normal   DP = Dysfuncional Painful     Side plank = no pain, unable to hold <10 seconds  Fwd plank = no pain, increased lordosis    Treatment     Haresh received therapeutic exercises to develop  strength, endurance, ROM, flexibility, posture, and core stabilization for 00 minutes including:     Haresh received the following manual therapy techniques: to develop flexibility and extensibility for    None performed    Haresh participated in neuromuscular re-education activities to develop Coordination, Control, Down training, Proprioception, and Sense for 40 minutes including:    Tate curl  ups   Cat and camel    Bird dogs    Side planks - modified to knee    Bridges - alt marches    Hip thrust     Haresh participated in dynamic functional therapeutic activities to improve functional performance for -- minutes, including:   None performed     Home Exercises Provided and Patient Education Provided     Education provided:   - updated HEP       Written Home Exercises Provided: yes.  Exercises were reviewed and Haresh was able to demonstrate them prior to the end of the session.  Haresh demonstrated good  understanding of the education provided.     See EMR under Patient Instructions for exercises provided 12/4/2023    Assessment     Pt last seen 11/13/2023. Overall pt has demonstrated improvement in pain level and functional activities. Focused on HEP independence during today's session. Pt continues to demonstrate impaired core stability and muscle endurance. Provided updated written HEP for patient to continue target to above deficits at home at this time. Recommended pt to contact PCP if pain persists or worsens within the next month.     Haresh Is not progressing well towards her goals.   Pt prognosis is Good.     Pt will continue to benefit from skilled outpatient physical therapy to address the deficits listed in the problem list box on initial evaluation, provide pt/family education and to maximize pt's level of independence in the home and community environment.     Pt's spiritual, cultural and educational needs considered and pt agreeable to plan of care and goals.     Anticipated barriers to physical therapy:  none    Goals:   Short Term Goals:  3 weeks  HEP: Patient will demonstrate 50% independence with HEP. met  Pain: Pt will demonstrate improved pain less than or equal to 3/10 at worse. met  Function: Patient will increase functional score to equal or greater than to 70 out of 100 on FOTO.   Mobility: Patient will improve trunk rotation by 15%. met  Strength: Patient will improve impaired  strength to greater or equal to 4/5. met  Pelvic Floor: Patient will demonstrate excellent knowledge and adherence to HEP to facilitate optimal recovery. met  Pelvic Floor: Patient will demonstrate proper PFM contraction, relaxation, and lengthening coordinated with TA and breath for improved muscle coordination needed for functional activity. met  Long Term Goals:  4 weeks  HEP: Pt will be independent with advanced HEP. met  Pain: Pt will demonstrate improved pain less than or equal to 1/10 at worse in order to progress toward maximal functional ability and improve QOL. Plateau   Function: Patient will  increase functional score to equal or greater than to 75 out of 100 on FOTO to improve functional independence and quality of life.    Mobility: Patient will demonstrate no pain with trunk ROM to return to prior level of function. met  Strength: Patient will improve impaired strength to greater or equal to 4+/5 in order to return to PLOF.   Pt will be able to bend forward with no increase of pain in order to care for baby. met  Pelvic Floor: Patient to report a decrease in pain to no more than 2/10 at it's worst with intercourse. Not met  Pelvic Floor: Patient to report improved restorative sleeping, waking up no more than 1x/night due to urge to urinate. Not met  Pelvic Floor: Patient to demonstrate proper positioning on commode with breathing techniques to decrease strain with BM to enable pt to feel empty after BM. met  Pelvic Floor: Patient to demonstrate independence with performing bowel massage to help with gut motility. Met      PLAN     Pt discharged from physical therapy.     Dorene Briggs, PT

## 2023-12-06 NOTE — PLAN OF CARE
Pelvic Health Physical Therapy   Treatment Note/ Discharge Note     Name: Haresh Hood  Clinic Number: [id number]    Therapy Diagnosis:   Encounter Diagnosis   Name Primary?    Lumbar pain Yes       Physician: Shannan Zavala MD    Visit Date: 12/4/2023    Physician Orders: PT Eval and Treat   Medical Diagnosis from Referral: Pain [R52]  Evaluation Date: 10/13/2023  Authorization Period Expiration: 03/28/2024  Plan of Care Expiration: 11/16/2023   Progress Note Due: 12/09/2023  Visit # / Visits authorized: 9/ 20 + eval  FOTO: Issued Visit #: 1 /3 (pelvic floor and lumbar)       Precautions: Standard, 7 weeks postpartum      Time In: 4:05  Time Out: 4:45  Total Appointment Time (timed & untimed codes): 40 minutes    Subjective     Pt reports: she was unable to attend therapy secondary to difficulties finding a . Continues to have intermittent pain to left low back.     She was compliant with home exercise program.  Response to previous treatment: no adverse symptoms  Functional change: no change     Pain: 3/10   Location: left low back     Objective     SFMA:  Top Tiers Right  (Spine) Left Notes    Multi-segmental   Flexion DN proximal knee       Multi-segmental Extension FN       Multi-segmental Rotation  FN DN 10% limited     Single Leg Stance  (10 sec) FN FN     FN = Functional Normal   FP = Functional Painful  DN = Dysfunctional Normal   DP = Dysfuncional Painful     Side plank = no pain, unable to hold <10 seconds  Fwd plank = no pain, increased lordosis    Treatment     Haresh received therapeutic exercises to develop  strength, endurance, ROM, flexibility, posture, and core stabilization for 00 minutes including:     Haresh received the following manual therapy techniques: to develop flexibility and extensibility for    None performed    Haresh participated in neuromuscular re-education activities to develop Coordination, Control, Down training, Proprioception, and Sense for 40 minutes  including:    Tate curl ups   Cat and camel    Bird dogs    Side planks - modified to knee    Bridges - alt marches    Hip thrust     Haresh participated in dynamic functional therapeutic activities to improve functional performance for -- minutes, including:   None performed     Home Exercises Provided and Patient Education Provided     Education provided:   - updated HEP       Written Home Exercises Provided: yes.  Exercises were reviewed and Haresh was able to demonstrate them prior to the end of the session.  Haresh demonstrated good  understanding of the education provided.     See EMR under Patient Instructions for exercises provided 12/4/2023    Assessment     Pt last seen 11/13/2023. Overall pt has demonstrated improvement in pain level and functional activities. Focused on HEP independence during today's session. Pt continues to demonstrate impaired core stability and muscle endurance. Provided updated written HEP for patient to continue target to above deficits at home at this time. Recommended pt to contact PCP if pain persists or worsens within the next month.     Haresh Is not progressing well towards her goals.   Pt prognosis is Good.     Pt will continue to benefit from skilled outpatient physical therapy to address the deficits listed in the problem list box on initial evaluation, provide pt/family education and to maximize pt's level of independence in the home and community environment.     Pt's spiritual, cultural and educational needs considered and pt agreeable to plan of care and goals.     Anticipated barriers to physical therapy:  none    Goals:   Short Term Goals:  3 weeks  HEP: Patient will demonstrate 50% independence with HEP. met  Pain: Pt will demonstrate improved pain less than or equal to 3/10 at worse. met  Function: Patient will increase functional score to equal or greater than to 70 out of 100 on FOTO.   Mobility: Patient will improve trunk rotation by 15%. met  Strength:  Patient will improve impaired strength to greater or equal to 4/5. met  Pelvic Floor: Patient will demonstrate excellent knowledge and adherence to HEP to facilitate optimal recovery. met  Pelvic Floor: Patient will demonstrate proper PFM contraction, relaxation, and lengthening coordinated with TA and breath for improved muscle coordination needed for functional activity. met  Long Term Goals:  4 weeks  HEP: Pt will be independent with advanced HEP. met  Pain: Pt will demonstrate improved pain less than or equal to 1/10 at worse in order to progress toward maximal functional ability and improve QOL. Plateau   Function: Patient will  increase functional score to equal or greater than to 75 out of 100 on FOTO to improve functional independence and quality of life.    Mobility: Patient will demonstrate no pain with trunk ROM to return to prior level of function. met  Strength: Patient will improve impaired strength to greater or equal to 4+/5 in order to return to PLOF.   Pt will be able to bend forward with no increase of pain in order to care for baby. met  Pelvic Floor: Patient to report a decrease in pain to no more than 2/10 at it's worst with intercourse. Not met  Pelvic Floor: Patient to report improved restorative sleeping, waking up no more than 1x/night due to urge to urinate. Not met  Pelvic Floor: Patient to demonstrate proper positioning on commode with breathing techniques to decrease strain with BM to enable pt to feel empty after BM. met  Pelvic Floor: Patient to demonstrate independence with performing bowel massage to help with gut motility. Met      PLAN     Pt discharged from physical therapy.     Dorene Briggs, PT